# Patient Record
Sex: MALE | Race: WHITE | NOT HISPANIC OR LATINO | URBAN - METROPOLITAN AREA
[De-identification: names, ages, dates, MRNs, and addresses within clinical notes are randomized per-mention and may not be internally consistent; named-entity substitution may affect disease eponyms.]

---

## 2018-05-02 DIAGNOSIS — G11.8 SPINOCEREBELLAR ATAXIA TYPE 2 (H): Primary | ICD-10-CM

## 2018-05-02 NOTE — PROGRESS NOTES
GENETIC COUNSELING-ataxia clinic  I placed orders for SCA2 testing given that Sp's mother and son have this diagnosis and are cared for or have been cared for in our clinic.    Dalton Campbell MS, [facility]  Licensed genetic counselor

## 2018-05-14 ENCOUNTER — HOSPITAL ENCOUNTER (OUTPATIENT)
Dept: LAB | Facility: CLINIC | Age: 63
Discharge: HOME | End: 2018-05-14
Attending: GENETIC COUNSELOR, MS | Admitting: GENETIC COUNSELOR, MS
Payer: COMMERCIAL

## 2018-05-14 DIAGNOSIS — G11.8 SPINOCEREBELLAR ATAXIA TYPE 2 (H): ICD-10-CM

## 2018-05-14 PROCEDURE — 36415 COLL VENOUS BLD VENIPUNCTURE: CPT | Performed by: GENETIC COUNSELOR, MS

## 2018-05-14 PROCEDURE — 81401 MOPATH PROCEDURE LEVEL 2: CPT | Performed by: GENETIC COUNSELOR, MS

## 2018-05-29 ENCOUNTER — TELEPHONE (OUTPATIENT)
Dept: NEUROLOGY | Facility: CLINIC | Age: 63
End: 2018-05-29

## 2018-05-29 NOTE — TELEPHONE ENCOUNTER
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  This is an expected result as both Sp's mother and son have been diagnosed with SCA2.  He asked that I mail a copy of his results to him.  We also discussed follow up. I explained that many of our patients follow up in our clinic as needed or as new medical issues arise.  The most important thing at this point is for patients to be connected with the [facility] as there are gene therapies that may be in clinical trials soon and it is important for patients to be in the loop if there are important clinical developments regarding treatment.    Dalton Campbell MS, [facility]  Licensed Genetic Counselor

## 2018-05-30 LAB — LAB SCANNED RESULT: ABNORMAL

## 2018-07-19 ENCOUNTER — DOCUMENTATION ONLY (OUTPATIENT)
Dept: NEUROLOGY | Facility: CLINIC | Age: 63
End: 2018-07-19

## 2018-07-19 NOTE — TELEPHONE ENCOUNTER
GENETIC COUNSELING-Ataxia clinic  Sp Luevano is a member of a family followed in our ataxia center at the [facility].  He has most recently been at our clinic related to the care of his son who has an early onset form of SCA2 with significant disability related to his diagnosis.  Sp has been present for genetic counseling sessions during his son's visits to our clinic in which we documented the family history and had a detailed discussion of the autosomal dominant inheritance of SCA2.  In particular, we discussed the CAG repeat mutation that causes SCA2 and how this is a dynamic mutation that can undergo significant expansion. This would be the basis for what appears to be a mild and very late onset form of ataxia that is observed in Sp and his mother, but a much more significant neurologic disease in his son.  During these discussions, we discussed the risks benefits and limitations of genetic testing for SCA2 for both Sp and his son.  His son subsequently had testing which confirmed that his son has spinocerebellar ataxia type 2.  We suspect that that his son's mutation was inherited from Sp as there is a history of neurologic symptoms in his family and we most often observe anticipation with paternal transmission of the trinucleotide repeat.  For Sp, this targeted test for SCA2 is the most efficient and cost-effective way to establish that he has SCA2.  We consider this medically necessary to establish a diagnosis of the familial neurodegenerative disease in Sp- and would facilitate our medical management for Sp in the future.      Dalton Campbell MS, [facility]  Licensed Genetic Counselor    Benji Cabello MD  Director of the [facility] Ataxia Clinic

## 2021-09-17 ENCOUNTER — LAB REQUISITION (OUTPATIENT)
Dept: LAB | Facility: CLINIC | Age: 66
End: 2021-09-17
Payer: COMMERCIAL

## 2021-09-17 DIAGNOSIS — L60.3 NAIL DYSTROPHY: ICD-10-CM

## 2021-09-17 LAB
ALT SERPL W P-5'-P-CCNC: 44 U/L (ref 0–70)
AST SERPL W P-5'-P-CCNC: 31 U/L (ref 0–45)
BASOPHILS # BLD AUTO: 0.1 10E3/UL (ref 0–0.2)
BASOPHILS NFR BLD AUTO: 1 %
EOSINOPHIL # BLD AUTO: 0.2 10E3/UL (ref 0–0.7)
EOSINOPHIL NFR BLD AUTO: 5 %
ERYTHROCYTE [DISTWIDTH] IN BLOOD BY AUTOMATED COUNT: 12.6 % (ref 10–15)
HCT VFR BLD AUTO: 44.4 % (ref 40–53)
HGB BLD-MCNC: 14 G/DL (ref 13.3–17.7)
IMM GRANULOCYTES # BLD: 0 10E3/UL
IMM GRANULOCYTES NFR BLD: 0 %
LYMPHOCYTES # BLD AUTO: 1.3 10E3/UL (ref 0.8–5.3)
LYMPHOCYTES NFR BLD AUTO: 31 %
MCH RBC QN AUTO: 29.5 PG (ref 26.5–33)
MCHC RBC AUTO-ENTMCNC: 31.5 G/DL (ref 31.5–36.5)
MCV RBC AUTO: 94 FL (ref 78–100)
MONOCYTES # BLD AUTO: 0.5 10E3/UL (ref 0–1.3)
MONOCYTES NFR BLD AUTO: 13 %
NEUTROPHILS # BLD AUTO: 2 10E3/UL (ref 1.6–8.3)
NEUTROPHILS NFR BLD AUTO: 50 %
NRBC # BLD AUTO: 0 10E3/UL
NRBC BLD AUTO-RTO: 0 /100
PLATELET # BLD AUTO: 305 10E3/UL (ref 150–450)
RBC # BLD AUTO: 4.74 10E6/UL (ref 4.4–5.9)
WBC # BLD AUTO: 4.1 10E3/UL (ref 4–11)

## 2021-09-17 PROCEDURE — 85025 COMPLETE CBC W/AUTO DIFF WBC: CPT | Mod: ORL | Performed by: DERMATOLOGY

## 2021-09-17 PROCEDURE — 84460 ALANINE AMINO (ALT) (SGPT): CPT | Mod: ORL | Performed by: DERMATOLOGY

## 2021-09-17 PROCEDURE — 84450 TRANSFERASE (AST) (SGOT): CPT | Mod: ORL | Performed by: DERMATOLOGY

## 2021-09-17 PROCEDURE — 36415 COLL VENOUS BLD VENIPUNCTURE: CPT | Mod: ORL | Performed by: DERMATOLOGY

## 2021-12-25 ENCOUNTER — HEALTH MAINTENANCE LETTER (OUTPATIENT)
Age: 66
End: 2021-12-25

## 2022-01-10 ENCOUNTER — LAB REQUISITION (OUTPATIENT)
Dept: LAB | Facility: CLINIC | Age: 67
End: 2022-01-10

## 2022-01-10 DIAGNOSIS — Z79.899 OTHER LONG TERM (CURRENT) DRUG THERAPY: ICD-10-CM

## 2022-01-10 LAB
ALT SERPL W P-5'-P-CCNC: 30 U/L (ref 0–70)
AST SERPL W P-5'-P-CCNC: 20 U/L (ref 0–45)
BASOPHILS # BLD AUTO: 0 10E3/UL (ref 0–0.2)
BASOPHILS NFR BLD AUTO: 1 %
EOSINOPHIL # BLD AUTO: 0.1 10E3/UL (ref 0–0.7)
EOSINOPHIL NFR BLD AUTO: 1 %
ERYTHROCYTE [DISTWIDTH] IN BLOOD BY AUTOMATED COUNT: 12.7 % (ref 10–15)
HCT VFR BLD AUTO: 43.4 % (ref 40–53)
HGB BLD-MCNC: 13.6 G/DL (ref 13.3–17.7)
IMM GRANULOCYTES # BLD: 0 10E3/UL
IMM GRANULOCYTES NFR BLD: 0 %
LYMPHOCYTES # BLD AUTO: 1.4 10E3/UL (ref 0.8–5.3)
LYMPHOCYTES NFR BLD AUTO: 21 %
MCH RBC QN AUTO: 29.2 PG (ref 26.5–33)
MCHC RBC AUTO-ENTMCNC: 31.3 G/DL (ref 31.5–36.5)
MCV RBC AUTO: 93 FL (ref 78–100)
MONOCYTES # BLD AUTO: 0.5 10E3/UL (ref 0–1.3)
MONOCYTES NFR BLD AUTO: 8 %
NEUTROPHILS # BLD AUTO: 4.5 10E3/UL (ref 1.6–8.3)
NEUTROPHILS NFR BLD AUTO: 69 %
NRBC # BLD AUTO: 0 10E3/UL
NRBC BLD AUTO-RTO: 0 /100
PLATELET # BLD AUTO: 293 10E3/UL (ref 150–450)
RBC # BLD AUTO: 4.65 10E6/UL (ref 4.4–5.9)
WBC # BLD AUTO: 6.5 10E3/UL (ref 4–11)

## 2022-01-10 PROCEDURE — 85025 COMPLETE CBC W/AUTO DIFF WBC: CPT | Performed by: DERMATOLOGY

## 2022-01-10 PROCEDURE — 36415 COLL VENOUS BLD VENIPUNCTURE: CPT | Mod: ORL | Performed by: DERMATOLOGY

## 2022-01-10 PROCEDURE — 84460 ALANINE AMINO (ALT) (SGPT): CPT | Mod: ORL | Performed by: DERMATOLOGY

## 2022-01-10 PROCEDURE — 84450 TRANSFERASE (AST) (SGOT): CPT | Performed by: DERMATOLOGY

## 2022-10-16 ENCOUNTER — HEALTH MAINTENANCE LETTER (OUTPATIENT)
Age: 67
End: 2022-10-16

## 2023-03-26 ENCOUNTER — HEALTH MAINTENANCE LETTER (OUTPATIENT)
Age: 68
End: 2023-03-26